# Patient Record
Sex: FEMALE | URBAN - METROPOLITAN AREA
[De-identification: names, ages, dates, MRNs, and addresses within clinical notes are randomized per-mention and may not be internally consistent; named-entity substitution may affect disease eponyms.]

---

## 2021-04-12 ENCOUNTER — HOSPITAL ENCOUNTER (EMERGENCY)
Age: 76
Discharge: LEFT BEFORE DISCHARGE | End: 2021-04-12
Attending: EMERGENCY MEDICINE

## 2021-04-12 VITALS
OXYGEN SATURATION: 97 % | DIASTOLIC BLOOD PRESSURE: 108 MMHG | SYSTOLIC BLOOD PRESSURE: 180 MMHG | RESPIRATION RATE: 16 BRPM | TEMPERATURE: 98.9 F | HEART RATE: 142 BPM

## 2021-04-12 PROCEDURE — 75810000275 HC EMERGENCY DEPT VISIT NO LEVEL OF CARE

## 2021-04-12 NOTE — ED TRIAGE NOTES
Pt Covid +, now having palpitations, denies sob, +cough, +fever, daughter who is a md stated her heart rate has been in the 160's, denies n/v/d

## 2021-04-13 NOTE — ED PROVIDER NOTES
9:48 PM  This patient was not seen by an ED provider. The patient was inadvertently assigned to me for [facility] throughput. Jeanette Ravi MD               Past Medical History:   Diagnosis Date    COVID-19     Hypertension        No past surgical history on file. History reviewed. No pertinent family history. Social History     Socioeconomic History    Marital status: Not on file     Spouse name: Not on file    Number of children: Not on file    Years of education: Not on file    Highest education level: Not on file   Occupational History    Not on file   Social Needs    Financial resource strain: Not on file    Food insecurity     Worry: Not on file     Inability: Not on file    Transportation needs     Medical: Not on file     Non-medical: Not on file   Tobacco Use    Smoking status: Not on file   Substance and Sexual Activity    Alcohol use: Not on file    Drug use: Not on file    Sexual activity: Not on file   Lifestyle    Physical activity     Days per week: Not on file     Minutes per session: Not on file    Stress: Not on file   Relationships    Social connections     Talks on phone: Not on file     Gets together: Not on file     Attends Congregational service: Not on file     Active member of club or organization: Not on file     Attends meetings of clubs or organizations: Not on file     Relationship status: Not on file    Intimate partner violence     Fear of current or ex partner: Not on file     Emotionally abused: Not on file     Physically abused: Not on file     Forced sexual activity: Not on file   Other Topics Concern    Not on file   Social History Narrative    Not on file         ALLERGIES: Patient has no known allergies.     Review of Systems    Vitals:    04/12/21 1508   BP: (!) 180/108   Pulse: (!) 142   Resp: 16   Temp: 98.9 °F (37.2 °C)   SpO2: 97%            Physical Exam     MDM       Procedures